# Patient Record
Sex: MALE | Race: WHITE | URBAN - METROPOLITAN AREA
[De-identification: names, ages, dates, MRNs, and addresses within clinical notes are randomized per-mention and may not be internally consistent; named-entity substitution may affect disease eponyms.]

---

## 2024-10-08 ENCOUNTER — OFFICE VISIT (OUTPATIENT)
Dept: URGENT CARE | Age: 34
End: 2024-10-08
Payer: COMMERCIAL

## 2024-10-08 VITALS
OXYGEN SATURATION: 98 % | TEMPERATURE: 98.4 F | SYSTOLIC BLOOD PRESSURE: 132 MMHG | WEIGHT: 190 LBS | BODY MASS INDEX: 25.73 KG/M2 | HEART RATE: 84 BPM | HEIGHT: 72 IN | DIASTOLIC BLOOD PRESSURE: 79 MMHG | RESPIRATION RATE: 14 BRPM

## 2024-10-08 DIAGNOSIS — J01.90 ACUTE SINUSITIS, RECURRENCE NOT SPECIFIED, UNSPECIFIED LOCATION: ICD-10-CM

## 2024-10-08 DIAGNOSIS — H66.92 LEFT OTITIS MEDIA, UNSPECIFIED OTITIS MEDIA TYPE: Primary | ICD-10-CM

## 2024-10-08 DIAGNOSIS — R43.2 LOSS OF TASTE: ICD-10-CM

## 2024-10-08 LAB — POC SARS-COV-2 AG BINAX: NORMAL

## 2024-10-08 RX ORDER — AMOXICILLIN AND CLAVULANATE POTASSIUM 875; 125 MG/1; MG/1
1 TABLET, FILM COATED ORAL 2 TIMES DAILY
Qty: 20 TABLET | Refills: 0 | Status: SHIPPED | OUTPATIENT
Start: 2024-10-08 | End: 2024-10-18

## 2024-10-08 ASSESSMENT — ENCOUNTER SYMPTOMS
SHORTNESS OF BREATH: 0
CHILLS: 0
COUGH: 1
SORE THROAT: 0
FEVER: 0
SINUS PRESSURE: 1
WHEEZING: 0
RHINORRHEA: 1
SINUS PAIN: 1
CHEST TIGHTNESS: 0

## 2024-10-08 NOTE — PROGRESS NOTES
Subjective   Patient ID: Cruz Nascimento is a 34 y.o. male. They present today with a chief complaint of Cough, Earache, Nasal Congestion, and Sinus Problem (2 wks, no taste).    History of Present Illness  Patient started with illness symptoms over two weeks ago. He states it felt like it started as a regular cold though his symptoms have progressed and worsened into left ear pain, sinus pain or pressure, thick purulent yellow discharge. He states that he has lost his sense of taste and has a scratchy throat. He does deny any fevers, chills, shortness of breath, chest tightness or wheezing. He states that everyone has been sick at home though they have all gotten better and he has gotten worse.          Past Medical History  Allergies as of 10/08/2024    (No Known Allergies)       (Not in a hospital admission)       No past medical history on file.    No past surgical history on file.         Review of Systems  Review of Systems   Constitutional:  Negative for chills and fever.   HENT:  Positive for congestion, ear pain, postnasal drip, rhinorrhea, sinus pressure and sinus pain. Negative for ear discharge and sore throat.    Respiratory:  Positive for cough. Negative for chest tightness, shortness of breath and wheezing.    Cardiovascular:  Negative for chest pain.                                  Objective    Vitals:    10/08/24 1404   BP: 132/79   BP Location: Left arm   Patient Position: Sitting   BP Cuff Size: Adult   Pulse: 84   Resp: 14   Temp: 36.9 °C (98.4 °F)   TempSrc: Oral   SpO2: 98%   Weight: 86.2 kg (190 lb)   Height: 1.829 m (6')     No LMP for male patient.    Physical Exam  Vitals and nursing note reviewed.   Constitutional:       Appearance: Normal appearance.   HENT:      Head: Normocephalic and atraumatic.      Right Ear: Tympanic membrane, ear canal and external ear normal.      Left Ear: Ear canal and external ear normal. Tympanic membrane is erythematous and bulging.      Nose: Congestion and  rhinorrhea present. Rhinorrhea is purulent.      Right Sinus: Frontal sinus tenderness present.      Left Sinus: Frontal sinus tenderness present.      Mouth/Throat:      Pharynx: No oropharyngeal exudate or posterior oropharyngeal erythema.   Cardiovascular:      Rate and Rhythm: Normal rate and regular rhythm.      Pulses: Normal pulses.      Heart sounds: Normal heart sounds.   Pulmonary:      Effort: Pulmonary effort is normal. No respiratory distress.      Breath sounds: Normal breath sounds. No wheezing, rhonchi or rales.   Neurological:      Mental Status: He is alert.         Procedures    Point of Care Test & Imaging Results from this visit  Results for orders placed or performed in visit on 10/08/24   POCT Covid-19 Rapid Antigen   Result Value Ref Range    POC JAYDA-COV-2 AG  Presumptive negative test for SARS-CoV-2 (no antigen detected)     Presumptive negative test for SARS-CoV-2 (no antigen detected)      No results found.    Diagnostic study results (if any) were reviewed by Emma Fatima PA-C.    Assessment/Plan   Allergies, medications, history, and pertinent labs/EKGs/Imaging reviewed by Emma Fatima PA-C.     Medical Decision Making  History and examination consistent with acute uncomplicated Otitis media and left sinusitis. No evidence of TM perforation, otitis externa, mastoiditis, or sepsis. Counseled patient/family on treatment plan with supportive measures and antibiotics. Return to clinic or present to ED if symptoms change or worsen. Otherwise follow-up with PCP. Patient/Parent verbalized understanding and agrees with plan.       Orders and Diagnoses  Diagnoses and all orders for this visit:  Left otitis media, unspecified otitis media type  -     amoxicillin-pot clavulanate (Augmentin) 875-125 mg tablet; Take 1 tablet by mouth 2 times a day for 10 days.  Acute sinusitis, recurrence not specified, unspecified location  -     amoxicillin-pot clavulanate (Augmentin) 875-125 mg tablet; Take  1 tablet by mouth 2 times a day for 10 days.  Loss of taste  -     POCT Covid-19 Rapid Antigen      Medical Admin Record      Patient disposition: Home    Electronically signed by Emma Fatima PA-C  2:18 PM